# Patient Record
Sex: MALE | Race: WHITE | NOT HISPANIC OR LATINO | Employment: OTHER | URBAN - METROPOLITAN AREA
[De-identification: names, ages, dates, MRNs, and addresses within clinical notes are randomized per-mention and may not be internally consistent; named-entity substitution may affect disease eponyms.]

---

## 2017-06-02 RX ORDER — LEVOTHYROXINE SODIUM 175 UG/1
175 TABLET ORAL
Qty: 90 TAB | Refills: 0 | Status: SHIPPED | OUTPATIENT
Start: 2017-06-02 | End: 2017-09-01 | Stop reason: SDUPTHER

## 2017-07-14 ENCOUNTER — OFFICE VISIT (OUTPATIENT)
Dept: ENDOCRINOLOGY | Facility: MEDICAL CENTER | Age: 67
End: 2017-07-14
Payer: MEDICARE

## 2017-07-14 VITALS
DIASTOLIC BLOOD PRESSURE: 80 MMHG | HEART RATE: 56 BPM | OXYGEN SATURATION: 93 % | BODY MASS INDEX: 33.05 KG/M2 | HEIGHT: 72 IN | WEIGHT: 244 LBS | SYSTOLIC BLOOD PRESSURE: 136 MMHG

## 2017-07-14 DIAGNOSIS — E55.9 VITAMIN D DEFICIENCY: ICD-10-CM

## 2017-07-14 DIAGNOSIS — E03.9 HYPOTHYROIDISM, UNSPECIFIED TYPE: ICD-10-CM

## 2017-07-14 DIAGNOSIS — E53.8 VITAMIN B12 DEFICIENCY: ICD-10-CM

## 2017-07-14 PROCEDURE — 99214 OFFICE O/P EST MOD 30 MIN: CPT | Performed by: INTERNAL MEDICINE

## 2017-07-14 NOTE — MR AVS SNAPSHOT
Tj Daniels   2017 11:30 AM   Office Visit   MRN: [id number]    Department:  [facility]   Dept Phone:  [phone]    Description:  Male : 1950   Provider:  Larry Ly M.D.           Reason for Visit     Hypothyroidism           Allergies as of 2017     No Known Allergies      You were diagnosed with     Hypothyroidism, unspecified type   [2923491]       Vitamin D deficiency   [9270471]       Vitamin B12 deficiency   [943206]         Vital Signs     Blood Pressure Pulse Height Weight Body Mass Index Oxygen Saturation    136/80 mmHg 56 1.829 m (6') 110.678 kg (244 lb) 33.09 kg/m2 93%    Smoking Status                   Never Smoker            Basic Information     Date Of Birth Sex Race Ethnicity Preferred Language    1950 Male White Non- English      Problem List              ICD-10-CM Priority Class Noted - Resolved    FHx: prostate cancer Z80.42   2015 - Present    Hyperlipidemia E78.5   2015 - Present    Essential hypertension I10   2015 - Present    Other specified hypothyroidism E03.8   2015 - Present    Hashimoto's thyroiditis E06.3   2016 - Present    Thyroid nodule E04.1   2016 - Present      Health Maintenance        Date Due Completion Dates    COLONOSCOPY 1999 ---    IMM ZOSTER VACCINE 2009 ---    IMM INFLUENZA (1) 2017 11/10/2016    IMM PNEUMOCOCCAL 65+ (ADULT) LOW/MEDIUM RISK SERIES (2 of 2 - PCV13) 11/10/2017 11/10/2016    IMM DTaP/Tdap/Td Vaccine (2 - Td) 2025            Current Immunizations     Dtap Vaccine 2015    Influenza Vaccine Adult HD 11/10/2016 11:49 AM    Pneumococcal polysaccharide vaccine (PPSV-23) 11/10/2016 11:50 AM      Below and/or attached are the medications your provider expects you to take. Review all of your home medications and newly ordered medications with your provider and/or pharmacist. Follow medication instructions as directed by your provider and/or  pharmacist. Please keep your medication list with you and share with your provider. Update the information when medications are discontinued, doses are changed, or new medications (including over-the-counter products) are added; and carry medication information at all times in the event of emergency situations     Allergies:  No Known Allergies          Medications  Valid as of: July 14, 2017 -  1:12 PM    Generic Name Brand Name Tablet Size Instructions for use    Aspirin (Chew Tab) ASA 81 MG Take 81 mg by mouth every day.        Cholecalciferol (Tab) cholecalciferol 1000 UNIT Take 1,000 Units by mouth every day.        DilTIAZem HCl (Tab) CARDIZEM 120 MG 1 tab b.i.d.        Levothyroxine Sodium (Tab) SYNTHROID 175 MCG Take 1 Tab by mouth Every morning on an empty stomach.        Pravastatin Sodium (Tab) PRAVACHOL 10 MG Take 1 Tab by mouth every day.        .                 Medicines prescribed today were sent to:     [facility] [address] [address]    Phone: [phone] Fax: [phone]    Open 24 Hours?: No      Medication refill instructions:       If your prescription bottle indicates you have medication refills left, it is not necessary to call your provider’s office. Please contact your pharmacy and they will refill your medication.    If your prescription bottle indicates you do not have any refills left, you may request refills at any time through one of the following ways: The online [employer] system (except Urgent Care), by calling your provider’s office, or by asking your pharmacy to contact your provider’s office with a refill request. Medication refills are processed only during regular business hours and may not be available until the next business day. Your provider may request additional information or to have a follow-up visit with you prior to refilling your medication.   *Please Note: Medication refills are assigned a new Rx  number when refilled electronically. Your pharmacy may indicate that no refills were authorized even though a new prescription for the same medication is available at the pharmacy. Please request the medicine by name with the pharmacy before contacting your provider for a refill.        Your To Do List     Future Labs/Procedures Complete By Expires    FREE THYROXINE  As directed 7/14/2018    TRIIDOTHYRONINE  As directed 7/14/2018    Comments:    Free T3    TRIIDOTHYRONINE  As directed 7/14/2018    Comments:    Total T3    TSH  As directed 7/14/2018    VITAMIN B12  As directed 7/14/2018    VITAMIN D,25 HYDROXY  As directed 7/14/2018         [employer] Access Code: Activation code not generated  Current [employer] Status: Active

## 2017-07-14 NOTE — PROGRESS NOTES
Endocrinology Clinic Progress Note  PCP: Ubaldo Funes M.D.    HPI:  Tj Daniels is a 67 y.o. old patient who comes in today for routine follow up of hypothyroidism on 175 µg of levothyroxine daily. He denies any complains and feels well.    ROS:  Constitutional: No unintentional weight loss  Endo: Denies excessive thirst or frequent urination    Past Medical History:  Patient Active Problem List    Diagnosis Date Noted   • Hashimoto's thyroiditis 08/09/2016   • Thyroid nodule 08/09/2016   • Hyperlipidemia 08/11/2015   • Essential hypertension 08/11/2015   • Other specified hypothyroidism 08/11/2015   • FHx: prostate cancer 08/07/2015       Medications:    Current outpatient prescriptions:   •  levothyroxine (SYNTHROID) 175 MCG Tab, Take 1 Tab by mouth Every morning on an empty stomach., Disp: 90 Tab, Rfl: 0  •  aspirin (ASA) 81 MG Chew Tab chewable tablet, Take 81 mg by mouth every day., Disp: , Rfl:   •  vitamin D (CHOLECALCIFEROL) 1000 UNIT Tab, Take 1,000 Units by mouth every day., Disp: , Rfl:   •  pravastatin (PRAVACHOL) 10 MG Tab, Take 1 Tab by mouth every day., Disp: 30 Tab, Rfl: 3  •  diltiazem (CARDIZEM) 120 MG Tab, 1 tab b.i.d., Disp: 180 Tab, Rfl: 0    Labs:     Physical Examination:  Vital signs: /80 mmHg  Pulse 56  Ht 1.829 m (6')  Wt 110.678 kg (244 lb)  BMI 33.09 kg/m2  SpO2 93% Body mass index is 33.09 kg/(m^2).  General: No apparent distress, cooperative  Eyes: No scleral icterus, no discharge, normal eyelids  Neck: No abnormal masses on inspection, normal thyroid exam  Resp: Normal effort, clear to auscultation bilaterally  CVS: Regular rate and rhythm, S1 S2 normal, no murmur  Extremities: No lower extremity edema  Abdomen: abdominal obesity present  Musculoskeletal: Normal digits and nails  Skin: No rash on visible skin  Psych: Alert and oriented, normal mood and affect, intact memory and able to make informed decisions.    Assessment and Plan:    1. Hypothyroidism, unspecified  type  We'll order a thyroid panel and adjust his levothyroxine dose if needed.    2. Vitamin D deficiency  He's currently on 1000 units of vitamin D daily. Check the levels to make sure they are in good range.    3. Vitamin B12 deficiency  Rule out vitamin B12 deficiency.      Return in about 6 months (around 1/14/2018).    Thank you for allowing me to participate in the care of this patient.    Larry Ly M.D.    CC:   Ubaldo Funes M.D.    This note was created using voice recognition software (Dragon). The accuracy of the dictation is limited by the abilities of the software. I have reviewed the note prior to signing, however some errors in grammar and context are still possible. If you have any questions related to this note please do not hesitate to contact our office.

## 2017-07-24 DIAGNOSIS — E55.9 VITAMIN D DEFICIENCY: ICD-10-CM

## 2017-07-24 RX ORDER — ERGOCALCIFEROL 1.25 MG/1
50000 CAPSULE ORAL
Qty: 12 CAP | Refills: 0 | Status: SHIPPED | OUTPATIENT
Start: 2017-07-24 | End: 2017-10-10

## 2017-07-24 NOTE — PROGRESS NOTES
Called cell phone. Left message to call us back .   If he calls back let him know that his vitamin D is low. I have sent a loading dose to pharmacy. He should still continue  1000 units daily on top of this.

## 2017-09-01 RX ORDER — LEVOTHYROXINE SODIUM 175 UG/1
175 TABLET ORAL
Qty: 90 TAB | Refills: 0 | Status: SHIPPED | OUTPATIENT
Start: 2017-09-01 | End: 2017-09-01 | Stop reason: SDUPTHER

## 2017-09-01 RX ORDER — LEVOTHYROXINE SODIUM 175 UG/1
TABLET ORAL
Qty: 40 TAB | Refills: 3 | Status: SHIPPED | OUTPATIENT
Start: 2017-09-01 | End: 2017-11-13 | Stop reason: SDUPTHER

## 2017-09-01 NOTE — TELEPHONE ENCOUNTER
Tj Daniels would like a refill of the following medications:   levothyroxine (SYNTHROID) 175 MCG Tab [Miriam Fong M.D.]     Preferred pharmacy: [id number] [address]     Comment:   Dr Ly called several weeks ago to tell me he was increasing my Levo dosage. I told him at that time that I still had most of a 90 day supply remaining. He then told me to continue with the 175 Levo daily but 2 days a week take two 175's. I have now exhausted my Levo supply and need the Doctor to send new perscription with his new recommended dosage to [facility].   Thank You

## 2017-11-07 PROBLEM — E55.9 VITAMIN D DEFICIENCY: Status: ACTIVE | Noted: 2017-11-07

## 2017-11-13 RX ORDER — LEVOTHYROXINE SODIUM 175 UG/1
TABLET ORAL
Qty: 120 TAB | Refills: 3 | Status: SHIPPED | OUTPATIENT
Start: 2017-11-13 | End: 2017-11-13 | Stop reason: SDUPTHER

## 2017-11-14 RX ORDER — LEVOTHYROXINE SODIUM 175 UG/1
TABLET ORAL
Qty: 90 TAB | Refills: 3 | Status: SHIPPED | OUTPATIENT
Start: 2017-11-14 | End: 2018-11-16

## 2018-12-11 PROBLEM — R13.10 ODYNOPHAGIA: Status: ACTIVE | Noted: 2018-12-11

## 2018-12-12 ENCOUNTER — OFFICE VISIT (OUTPATIENT)
Dept: ENDOCRINOLOGY | Facility: MEDICAL CENTER | Age: 68
End: 2018-12-12
Payer: MEDICARE

## 2018-12-12 VITALS
BODY MASS INDEX: 34.81 KG/M2 | SYSTOLIC BLOOD PRESSURE: 118 MMHG | OXYGEN SATURATION: 94 % | HEIGHT: 72 IN | WEIGHT: 257 LBS | DIASTOLIC BLOOD PRESSURE: 74 MMHG | HEART RATE: 66 BPM

## 2018-12-12 DIAGNOSIS — E03.9 HYPOTHYROIDISM, UNSPECIFIED TYPE: ICD-10-CM

## 2018-12-12 DIAGNOSIS — E55.9 VITAMIN D DEFICIENCY: ICD-10-CM

## 2018-12-12 PROCEDURE — 99214 OFFICE O/P EST MOD 30 MIN: CPT | Performed by: INTERNAL MEDICINE

## 2018-12-12 RX ORDER — DILTIAZEM HYDROCHLORIDE 120 MG/1
CAPSULE, EXTENDED RELEASE ORAL
COMMUNITY
Start: 2018-11-30 | End: 2018-12-12

## 2018-12-12 RX ORDER — LEVOTHYROXINE SODIUM 0.2 MG/1
TABLET ORAL
Qty: 90 TAB | Refills: 3 | Status: SHIPPED | OUTPATIENT
Start: 2018-12-12 | End: 2019-07-23 | Stop reason: SDUPTHER

## 2018-12-12 RX ORDER — LEVOTHYROXINE SODIUM 0.03 MG/1
TABLET ORAL
Qty: 90 TAB | Refills: 3 | Status: SHIPPED | OUTPATIENT
Start: 2018-12-12 | End: 2019-07-23

## 2018-12-12 RX ORDER — ERGOCALCIFEROL 1.25 MG/1
50000 CAPSULE ORAL
Qty: 12 CAP | Refills: 3 | Status: SHIPPED | OUTPATIENT
Start: 2018-12-12 | End: 2019-10-29 | Stop reason: SDUPTHER

## 2018-12-12 RX ORDER — LEVOTHYROXINE SODIUM 175 UG/1
TABLET ORAL
COMMUNITY
Start: 2018-10-18 | End: 2018-12-12

## 2018-12-12 NOTE — PROGRESS NOTES
Endocrinology Clinic Progress Note  PCP: Ubaldo Funes M.D.    HPI:  Tj Daniels is a 68 y.o. old patient who comes in today for review of endocrine problems.   Hypothyroid, currently on levothyroxine 175 mcg 2 tabs two days a week   Vitamin d deficiency, currently on vitamin d 2000 iu per day    ROS:  Constitutional: No weight loss  Cardiac: No palpitations or racing heart  Resp: No shortness of breath  Neuro: No numbness or tinging in feet  Endo: No heat or cold intolerance, no polyuria or polydipsia  All other systems were reviewed and were negative.    Past Medical History:  Patient Active Problem List    Diagnosis Date Noted   • Odynophagia 12/11/2018   • Vitamin D deficiency 11/07/2017   • Hashimoto's thyroiditis 08/09/2016   • Thyroid nodule 08/09/2016   • Hyperlipidemia 08/11/2015   • Essential hypertension 08/11/2015   • Other specified hypothyroidism 08/11/2015   • FHx: prostate cancer 08/07/2015       Past Surgical History:  History reviewed. No pertinent surgical history.    Allergies:  Patient has no known allergies.    Social History:  Social History     Social History   • Marital status:      Spouse name: N/A   • Number of children: N/A   • Years of education: N/A     Occupational History   • Not on file.     Social History Main Topics   • Smoking status: Never Smoker   • Smokeless tobacco: Former User   • Alcohol use 1.2 oz/week     2 Glasses of wine per week   • Drug use: No   • Sexual activity: Not on file     Other Topics Concern   • Not on file     Social History Narrative   • No narrative on file       Family History:  Family History   Problem Relation Age of Onset   • Cancer Father    • Cancer Sister    • Other Sister         thyroid   • Cancer Unknown         Prostate   • Cancer Brother        Medications:    Current Outpatient Prescriptions:   •  vitamin D, Ergocalciferol, (DRISDOL) 24115 units Cap capsule, Take 1 Cap by mouth every 7 days., Disp: 12 Cap, Rfl: 3  •  levothyroxine  (SYNTHROID) 200 MCG Tab, 1 tablet daily along with 25 mcg of levothyroxine daily. (Total daily dose=225 mcg), Disp: 90 Tab, Rfl: 3  •  levothyroxine (SYNTHROID) 25 MCG Tab, 1 tablet daily along with 200 mcg of levothyroxine daily. (Total daily dose=225 mcg), Disp: 90 Tab, Rfl: 3  •  losartan (COZAAR) 25 MG Tab, 1 bid (Patient taking differently: 50 mg.), Disp: 180 Tab, Rfl: 3  •  DILTIAZem CD (DILTIAZEM CD) 120 MG CAPSULE SR 24 HR, Take 1 Cap by mouth every day., Disp: 90 Cap, Rfl: 3  •  pravastatin (PRAVACHOL) 10 MG Tab, Take 1 Tab by mouth every day., Disp: 90 Tab, Rfl: 3  •  aspirin (ASA) 81 MG Chew Tab chewable tablet, Take 81 mg by mouth every day., Disp: , Rfl:   •  vitamin D (CHOLECALCIFEROL) 1000 UNIT Tab, Take 2,000 Units by mouth every day., Disp: , Rfl:   •  indomethacin (INDOCIN) 25 MG Cap, To start on week 2. 1 tab po tid with decreasing dose as tolerated, Disp: 21 Cap, Rfl: 0    Labs: Reviewed    Physical Examination:  Vital signs: /74   Pulse 66   Ht 1.829 m (6')   Wt 116.6 kg (257 lb)   SpO2 94%   BMI 34.86 kg/m²  Body mass index is 34.86 kg/m².  General: No apparent distress, cooperative  Eyes: No scleral icterus or discharge  ENMT: Normal on external inspection of nose, lips, normal thyroid exam  Neck: No abnormal masses on inspection  Resp: Normal effort, clear to auscultation bilaterally   CVS: Regular rate and rhythm, S1 S2 normal, no murmur   Extremities: No edema  Abdomen: abdominal obesity present  Neuro: Alert and oriented  Skin: No rash  Psych: Normal mood and affect, intact memory and able to make informed decisions    Assessment and Plan:    1. Hypothyroidism, unspecified type  With the way he is currently taking his levothyroxine is average daily dose is around 225 mcg daily.  I will change his prescription to 225 mcg daily.    2. Vitamin D deficiency  Recommend 50,000 units once a week.      Return in about 3 months (around 3/12/2019).    Total face to face time spent with  patient equals 60 minutes.Thank you for allowing me to participate in the care of this patient.    Larry Ly M.D.  12/12/18    CC:   Ubaldo Funes M.D.    This note was created using voice recognition software (Dragon). The accuracy of the dictation is limited by the abilities of the software. I have reviewed the note prior to signing, however some errors in grammar and context are still possible. If you have any questions related to this note please do not hesitate to contact our office.

## 2019-01-17 PROBLEM — R13.10 ODYNOPHAGIA: Status: RESOLVED | Noted: 2018-12-11 | Resolved: 2019-01-17

## 2019-01-17 PROBLEM — K21.00 GASTROESOPHAGEAL REFLUX DISEASE WITH ESOPHAGITIS: Status: ACTIVE | Noted: 2019-01-17

## 2019-01-17 PROBLEM — K22.2 ESOPHAGEAL STRICTURE: Status: ACTIVE | Noted: 2019-01-17

## 2019-03-27 ENCOUNTER — OFFICE VISIT (OUTPATIENT)
Dept: ENDOCRINOLOGY | Facility: MEDICAL CENTER | Age: 69
End: 2019-03-27
Payer: MEDICARE

## 2019-03-27 VITALS
HEART RATE: 57 BPM | BODY MASS INDEX: 34.54 KG/M2 | DIASTOLIC BLOOD PRESSURE: 74 MMHG | WEIGHT: 255 LBS | HEIGHT: 72 IN | OXYGEN SATURATION: 94 % | SYSTOLIC BLOOD PRESSURE: 122 MMHG

## 2019-03-27 DIAGNOSIS — E03.9 HYPOTHYROIDISM, UNSPECIFIED TYPE: ICD-10-CM

## 2019-03-27 DIAGNOSIS — E55.9 VITAMIN D DEFICIENCY: ICD-10-CM

## 2019-03-27 DIAGNOSIS — E53.8 VITAMIN B12 DEFICIENCY: ICD-10-CM

## 2019-03-27 PROCEDURE — 99214 OFFICE O/P EST MOD 30 MIN: CPT | Performed by: INTERNAL MEDICINE

## 2019-03-28 NOTE — PROGRESS NOTES
Endocrinology Clinic Progress Note  PCP: Ubaldo Funes M.D.    HPI:  Tj Daniels is a 68 y.o. old patient who comes in today for review of endocrine problems.  Hypothyroidism: Currently on 225 mcg of levothyroxine daily.  Vitamin D deficiency: On 50,000 units once a week.    He overall feels well and denies any complaints.    ROS:  Constitutional: No unintentional weight loss  Endo: Denies excessive thirst or frequent urination  All other systems were reviewed and were negative.    Past Medical History:  Patient Active Problem List    Diagnosis Date Noted   • Esophageal stricture 01/17/2019   • Gastroesophageal reflux disease with esophagitis 01/17/2019   • Vitamin D deficiency 11/07/2017   • Hashimoto's thyroiditis 08/09/2016   • Thyroid nodule 08/09/2016   • Hyperlipidemia 08/11/2015   • Essential hypertension 08/11/2015   • Other specified hypothyroidism 08/11/2015   • FHx: prostate cancer 08/07/2015       Medications:    Current Outpatient Prescriptions:   •  pantoprazole (PROTONIX) 20 MG tablet, Take 20 mg by mouth every day., Disp: , Rfl:   •  vitamin D, Ergocalciferol, (DRISDOL) 38842 units Cap capsule, Take 1 Cap by mouth every 7 days., Disp: 12 Cap, Rfl: 3  •  losartan (COZAAR) 25 MG Tab, 1 bid (Patient taking differently: 50 mg.), Disp: 180 Tab, Rfl: 3  •  DILTIAZem CD (DILTIAZEM CD) 120 MG CAPSULE SR 24 HR, Take 1 Cap by mouth every day., Disp: 90 Cap, Rfl: 3  •  pravastatin (PRAVACHOL) 10 MG Tab, Take 1 Tab by mouth every day., Disp: 90 Tab, Rfl: 3  •  aspirin (ASA) 81 MG Chew Tab chewable tablet, Take 81 mg by mouth every day., Disp: , Rfl:   •  vitamin D (CHOLECALCIFEROL) 1000 UNIT Tab, Take 2,000 Units by mouth every day., Disp: , Rfl:   •  levothyroxine (SYNTHROID) 200 MCG Tab, 1 tablet daily along with 25 mcg of levothyroxine daily. (Total daily dose=225 mcg) (Patient not taking: Reported on 3/27/2019), Disp: 90 Tab, Rfl: 3  •  levothyroxine (SYNTHROID) 25 MCG Tab, 1 tablet daily along with  200 mcg of levothyroxine daily. (Total daily dose=225 mcg) (Patient not taking: Reported on 3/27/2019), Disp: 90 Tab, Rfl: 3  •  indomethacin (INDOCIN) 25 MG Cap, To start on week 2. 1 tab po tid with decreasing dose as tolerated, Disp: 21 Cap, Rfl: 0    Labs: Reviewed    Physical Examination:  Vital signs: /74 (BP Location: Left arm, Patient Position: Sitting, BP Cuff Size: Large adult)   Pulse (!) 57   Ht 1.829 m (6')   Wt 115.7 kg (255 lb)   SpO2 94%   BMI 34.58 kg/m²  Body mass index is 34.58 kg/m².  General: No apparent distress, cooperative  Eyes: No scleral icterus, no discharge, normal eyelids  Neck: No abnormal masses on inspection, normal thyroid exam  Resp: Normal effort, clear to auscultation bilaterally  CVS: Regular rate and rhythm, S1 S2 normal, no murmur  Extremities: No lower extremity edema  Abdomen: abdominal obesity present  Musculoskeletal: Normal digits and nails  Skin: No rash on visible skin  Psych: Alert and oriented, normal mood and affect, intact memory and able to make informed decisions.    Assessment and Plan:    1. Hypothyroidism, unspecified type  Continue current dose of levothyroxine  - TSH; Future  - FREE THYROXINE; Future  - T3 FREE; Future  - TRIIDOTHYRONINE; Future    2. Vitamin D deficiency  continue vitamin D supplementation.  - VITAMIN D,25 HYDROXY; Future    3. Vitamin B12 deficiency  Continue vitamin B12 supplementation.  - VITAMIN B12; Future    Return in about 4 months (around 7/27/2019).    Thank you for allowing me to participate in the care of this patient.    Larry Ly    CC:   Ubaldo Funes M.D.    This note was created using voice recognition software (Dragon). The accuracy of the dictation is limited by the abilities of the software. I have reviewed the note prior to signing, however some errors in grammar and context are still possible. If you have any questions related to this note please do not hesitate to contact our office.

## 2019-04-01 ENCOUNTER — TELEPHONE (OUTPATIENT)
Dept: ENDOCRINOLOGY | Facility: MEDICAL CENTER | Age: 69
End: 2019-04-01

## 2019-04-01 NOTE — TELEPHONE ENCOUNTER
Phone Number Called: [phone] (home)       Message: lm for patient to call back to schedule apt     Left Message for patient to call back: no

## 2019-04-01 NOTE — TELEPHONE ENCOUNTER
----- Message from Tj Daniels sent at 3/27/2019  5:24 PM PDT -----  Regarding: Non-Urgent Medical Question  Contact: [phone]  Need appointment scheduled July 22 or 23 or 25 or 26 ( please not the 24th). Afternoons preferred. Driving from [place].

## 2019-07-22 NOTE — PROGRESS NOTES
Endocrinology Clinic Progress Note  PCP: Ubaldo Funes M.D.    HPI:  Tj Daniels is a 69 y.o. old patient who comes in today for review of endocrine problems.    Hypothyroidism: Currently taking Levothyroxine 225 mcg daily.  Denies problems with anxiety, heart palpitations or sweating.  He does have a hard time staying asleep at night.    Vitamin D Deficiency:   Currently taking Vitamin D 50,000 units weekly plus Vitamin 2000 units daily.      ROS:  Constitutional:  15 pound weight loss on keto diet.  Endo: Denies excessive thirst or frequent urination  All other systems were reviewed and were negative.    Past Medical History:  Patient Active Problem List    Diagnosis Date Noted   • Esophageal stricture 01/17/2019   • Gastroesophageal reflux disease with esophagitis 01/17/2019   • Vitamin D deficiency 11/07/2017   • Hashimoto's thyroiditis 08/09/2016   • Thyroid nodule 08/09/2016   • Hyperlipidemia 08/11/2015   • Essential hypertension 08/11/2015   • Other specified hypothyroidism 08/11/2015   • FHx: prostate cancer 08/07/2015       Medications:    Current Outpatient Prescriptions:   •  levothyroxine (SYNTHROID) 200 MCG Tab, Take 1 Tab by mouth Every morning on an empty stomach., Disp: 90 Tab, Rfl: 3  •  pantoprazole (PROTONIX) 20 MG tablet, Take 20 mg by mouth every day., Disp: , Rfl:   •  vitamin D, Ergocalciferol, (DRISDOL) 67560 units Cap capsule, Take 1 Cap by mouth every 7 days., Disp: 12 Cap, Rfl: 3  •  losartan (COZAAR) 25 MG Tab, 1 bid (Patient taking differently: 50 mg.), Disp: 180 Tab, Rfl: 3  •  DILTIAZem CD (DILTIAZEM CD) 120 MG CAPSULE SR 24 HR, Take 1 Cap by mouth every day., Disp: 90 Cap, Rfl: 3  •  pravastatin (PRAVACHOL) 10 MG Tab, Take 1 Tab by mouth every day., Disp: 90 Tab, Rfl: 3  •  aspirin (ASA) 81 MG Chew Tab chewable tablet, Take 81 mg by mouth every day., Disp: , Rfl:   •  vitamin D (CHOLECALCIFEROL) 1000 UNIT Tab, Take 2,000 Units by mouth every day., Disp: , Rfl:   •  indomethacin  (INDOCIN) 25 MG Cap, To start on week 2. 1 tab po tid with decreasing dose as tolerated, Disp: 21 Cap, Rfl: 0    Labs: Reviewed    Physical Examination:  Vital signs: /80   Pulse 60   Ht 1.829 m (6')   Wt 109.3 kg (241 lb)   SpO2 94%   BMI 32.69 kg/m²  Body mass index is 32.69 kg/m². Patient's body mass index is 32.69 kg/m². Exercise and nutrition counseling were performed at this visit.  Started Yoga and gofing.  General: No apparent distress, cooperative  Eyes: No scleral icterus, no discharge, normal eyelids  Neck: No abnormal masses on inspection, normal thyroid exam  Resp: Normal effort, clear to auscultation bilaterally  CVS: Regular rate and rhythm, S1 S2 normal, no murmur  Extremities: No lower extremity edema  Abdomen: abdominal obesity present  Musculoskeletal: Normal digits and nails  Skin: No rash on visible skin  Psych: Alert and oriented, normal mood and affect, intact memory and able to make informed decisions.    Assessment and Plan:    1. Acquired hypothyroidism  Reduce levothyroxine to 200 mcg daily    2. Vitamin D deficiency  continue vitamin D supplementation    Return in about 3 months (around 10/23/2019).    Thank you for allowing me to participate in the care of this patient.    Larry Ly M.D.  This note was scribed by Penelope Green RN CDE  CC:   Ubaldo Funes M.D.    This note was created using voice recognition software (Dragon). The accuracy of the dictation is limited by the abilities of the software. I have reviewed the note prior to signing, however some errors in grammar and context are still possible. If you have any questions related to this note please do not hesitate to contact our office.

## 2019-07-23 ENCOUNTER — OFFICE VISIT (OUTPATIENT)
Dept: ENDOCRINOLOGY | Facility: MEDICAL CENTER | Age: 69
End: 2019-07-23
Payer: MEDICARE

## 2019-07-23 VITALS
WEIGHT: 241 LBS | OXYGEN SATURATION: 94 % | DIASTOLIC BLOOD PRESSURE: 80 MMHG | HEART RATE: 60 BPM | BODY MASS INDEX: 32.64 KG/M2 | SYSTOLIC BLOOD PRESSURE: 122 MMHG | HEIGHT: 72 IN

## 2019-07-23 DIAGNOSIS — E55.9 VITAMIN D DEFICIENCY: ICD-10-CM

## 2019-07-23 DIAGNOSIS — E03.9 ACQUIRED HYPOTHYROIDISM: ICD-10-CM

## 2019-07-23 PROCEDURE — 99214 OFFICE O/P EST MOD 30 MIN: CPT | Performed by: INTERNAL MEDICINE

## 2019-07-23 RX ORDER — LEVOTHYROXINE SODIUM 0.2 MG/1
200 TABLET ORAL
Qty: 90 TAB | Refills: 3 | Status: SHIPPED | OUTPATIENT
Start: 2019-07-23 | End: 2020-09-22 | Stop reason: SDUPTHER

## 2019-10-27 NOTE — PROGRESS NOTES
Endocrinology Clinic Progress Note  PCP: Ubaldo Funes M.D.    HPI:  Tj Pal is a 69 y.o. old patient who comes in today for review of endocrine problems.  States he is feeling good with no complaints.      Hypothyroidism: Currently taking Levothyroxine 200 mcg daily.  Results for TJ PAL (MRN [id number]) as of 10/27/2019 14:28   Ref. Range 10/21/2019 14:33   TSH Latest Ref Range: 0.36 - 3.74 uIU/mL 0.15 (L)   Free T-4 Latest Ref Range: 0.76 - 1.46 ng/dL 1.42   T3 Latest Ref Range: 60 - 181 ng/dL 80   T3,Free Latest Ref Range: 2.18 - 3.98 pg/mL 2.46        Vitamin D Deficiency:   Currently taking Vitamin D 50,000 units weekly.  Results for TJ PAL (MRN [id number]) as of 10/27/2019 14:28   Ref. Range 10/21/2019 14:30   25-Hydroxy   Vitamin D 25 Latest Ref Range: 30 - 100 ng/mL 51        ROS:  Constitutional: No unintentional weight loss  Endo: Denies excessive thirst or frequent urination  All other systems were reviewed and were negative.    Past Medical History:  Patient Active Problem List    Diagnosis Date Noted   • Esophageal stricture 01/17/2019   • Gastroesophageal reflux disease with esophagitis 01/17/2019   • Vitamin D deficiency 11/07/2017   • Hashimoto's thyroiditis 08/09/2016   • Thyroid nodule 08/09/2016   • Hyperlipidemia 08/11/2015   • Essential hypertension 08/11/2015   • Other specified hypothyroidism 08/11/2015   • FHx: prostate cancer 08/07/2015       Medications:    Current Outpatient Medications:   •  vitamin D, Ergocalciferol, (DRISDOL) 06209 units Cap capsule, Take 1 Cap by mouth every 7 days., Disp: 12 Cap, Rfl: 3  •  pravastatin (PRAVACHOL) 10 MG Tab, Take 1 Tab by mouth every day., Disp: 100 Tab, Rfl: 3  •  losartan (COZAAR) 25 MG Tab, 1 bid, Disp: 200 Tab, Rfl: 3  •  levothyroxine (SYNTHROID) 200 MCG Tab, Take 1 Tab by mouth Every morning on an empty stomach., Disp: 90 Tab, Rfl: 3  •  pantoprazole (PROTONIX) 20 MG tablet, Take 20 mg by mouth every day., Disp: , Rfl:  [facility] OPERATING ROOM  Brief Op Note     Casie Lawson Location: OR   [facility] [id number] MRN [id number]   Admission Date 4/4/2017 Operation Date 4/4/2017   Attending Physician Tara Burnett MD Operating Physician Arnel Parrish MD       Pre-Opera   •  DILTIAZem CD (DILTIAZEM CD) 120 MG CAPSULE SR 24 HR, Take 1 Cap by mouth every day., Disp: 90 Cap, Rfl: 3  •  indomethacin (INDOCIN) 25 MG Cap, To start on week 2. 1 tab po tid with decreasing dose as tolerated, Disp: 21 Cap, Rfl: 0  •  aspirin (ASA) 81 MG Chew Tab chewable tablet, Take 81 mg by mouth every day., Disp: , Rfl:     Labs: Reviewed    Physical Examination:  Vital signs: /60   Pulse 68   Ht 1.829 m (6')   Wt 107 kg (236 lb)   SpO2 96%   BMI 32.01 kg/m²  Body mass index is 32.01 kg/m². Patient's body mass index is 32.01 kg/m². Exercise and nutrition counseling were performed at this visit.  Active throughout the week.  General: No apparent distress, cooperative  Eyes: No scleral icterus, no discharge, normal eyelids  Neck: No abnormal masses on inspection, normal thyroid exam  Resp: Normal effort, clear to auscultation bilaterally  CVS: Regular rate and rhythm, S1 S2 normal, no murmur  Extremities: No lower extremity edema  Abdomen:  Mild abdominal obesity present  Musculoskeletal: Normal digits and nails  Skin: No rash on visible skin  Psych: Alert and oriented, normal mood and affect, intact memory and able to make informed decisions.    Assessment and Plan:    1. Acquired hypothyroidism  To new current dose of levothyroxine.  Clinically euthyroid and feels exceedingly well    2. Vitamin D deficiency  Continue vitamin D supplementation.    Return in about 6 months (around 4/29/2020).    Thank you for allowing me to participate in the care of this patient.    Larry Ly M.D.  This note was scribed by Penelope Green RN CDE  CC:   Ubaldo Funes M.D.    This note was created using voice recognition software (Dragon). The accuracy of the dictation is limited by the abilities of the software. I have reviewed the note prior to signing, however some errors in grammar and context are still possible. If you have any questions related to this note please do not hesitate to contact our office.

## 2019-10-29 ENCOUNTER — OFFICE VISIT (OUTPATIENT)
Dept: ENDOCRINOLOGY | Facility: MEDICAL CENTER | Age: 69
End: 2019-10-29
Payer: MEDICARE

## 2019-10-29 VITALS
HEART RATE: 68 BPM | OXYGEN SATURATION: 96 % | WEIGHT: 236 LBS | SYSTOLIC BLOOD PRESSURE: 120 MMHG | DIASTOLIC BLOOD PRESSURE: 60 MMHG | HEIGHT: 72 IN | BODY MASS INDEX: 31.97 KG/M2

## 2019-10-29 DIAGNOSIS — E53.8 VITAMIN B12 DEFICIENCY: ICD-10-CM

## 2019-10-29 DIAGNOSIS — E55.9 VITAMIN D DEFICIENCY: ICD-10-CM

## 2019-10-29 DIAGNOSIS — E03.9 ACQUIRED HYPOTHYROIDISM: ICD-10-CM

## 2019-10-29 PROCEDURE — 99214 OFFICE O/P EST MOD 30 MIN: CPT | Performed by: INTERNAL MEDICINE

## 2019-10-29 RX ORDER — ERGOCALCIFEROL 1.25 MG/1
50000 CAPSULE ORAL
Qty: 12 CAP | Refills: 3 | Status: SHIPPED | OUTPATIENT
Start: 2019-10-29 | End: 2020-09-22 | Stop reason: SDUPTHER

## 2019-10-29 NOTE — LETTER
Larry Ly M.D.  [facility]   [address], [address] [place] [address]  Phone: [phone]  Fax: [phone]              October 29, 2019 -        Provider: Larry Ly M.D.  Location of Care: [facility]  [address]  [place] [address]      Patient:   Tj Daniels   MR Number: [id number]   YOB: 1950         To Whomsoever it may concern    He has chronic vit d deficiency.Please let me know if you have any questions or concerns.       Electronically sign by Larry Ly M.D. on 10/29/19

## 2020-03-24 PROBLEM — R01.1 SYSTOLIC MURMUR: Status: ACTIVE | Noted: 2020-03-24

## 2020-04-28 PROBLEM — E03.8 HYPOTHYROIDISM DUE TO HASHIMOTO'S THYROIDITIS: Status: ACTIVE | Noted: 2020-04-28

## 2020-04-28 PROBLEM — E06.3 HYPOTHYROIDISM DUE TO HASHIMOTO'S THYROIDITIS: Status: ACTIVE | Noted: 2020-04-28

## 2020-04-28 NOTE — PROGRESS NOTES
"Endocrinology Clinic Progress Note  PCP: Ubaldo Funes M.D.  This encounter was conducted via {Platform used:22222::\"Zoom \"}.   Verbal consent was obtained. Patient's identity was verified.  HPI:  Tj Daniels is a 69 y.o. old patient who is seen today for review of his hypothyroid and Vitamin D deficiency.   Hypothyroid due to Hashimoto's thyroiditis.  He is currently on Levothyroxine 200 mcg per day on empty stomach.   Patient reports compliance with medication.      Ref. Range 10/21/2019 14:33   TSH Latest Ref Range: 0.36 - 3.74 uIU/mL 0.15 (L)   Free T-4 Latest Ref Range: 0.76 - 1.46 ng/dL 1.42   T3 Latest Ref Range: 60 - 181 ng/dL 80   T3,Free Latest Ref Range: 2.18 - 3.98 pg/mL 2.46     Vitamin D deficiency: currently on Vitamin D 11362 iu per week.      Ref. Range 10/21/2019 14:30   25-Hydroxy   Vitamin D 25 Latest Ref Range: 30 - 100 ng/mL 51         ROS:  Constitutional: No weight loss  Cardiac: No palpitations or racing heart  Resp: No shortness of breath  Neuro: No numbness or tinging in feet  Endo: No heat or cold intolerance, no polyuria or polydipsia  All other systems were reviewed and were negative.    Past Medical History:  Patient Active Problem List    Diagnosis Date Noted   • Hypothyroidism due to Hashimoto's thyroiditis 04/28/2020   • Systolic murmur 03/24/2020   • Esophageal stricture 01/17/2019   • Gastroesophageal reflux disease with esophagitis 01/17/2019   • Vitamin D deficiency 11/07/2017   • Hashimoto's thyroiditis 08/09/2016   • Thyroid nodule 08/09/2016   • Hyperlipidemia 08/11/2015   • Essential hypertension 08/11/2015   • Other specified hypothyroidism 08/11/2015   • FHx: prostate cancer 08/07/2015       Past Surgical History:  No past surgical history on file.    Allergies:  Patient has no known allergies.    Social History:  Social History     Socioeconomic History   • Marital status:      Spouse name: Not on file   • Number of children: Not on file   • Years of " education: Not on file   • Highest education level: Not on file   Occupational History   • Not on file   Social Needs   • Financial resource strain: Not on file   • Food insecurity     Worry: Not on file     Inability: Not on file   • Transportation needs     Medical: Not on file     Non-medical: Not on file   Tobacco Use   • Smoking status: Never Smoker   • Smokeless tobacco: Former User   Substance and Sexual Activity   • Alcohol use: Yes     Alcohol/week: 1.2 oz     Types: 2 Glasses of wine per week     Frequency: 2-4 times a month     Drinks per session: 1 or 2     Binge frequency: Never   • Drug use: No   • Sexual activity: Not on file   Lifestyle   • Physical activity     Days per week: Not on file     Minutes per session: Not on file   • Stress: Not on file   Relationships   • Social connections     Talks on phone: Not on file     Gets together: Not on file     Attends [employer] service: Not on file     Active member of club or organization: Not on file     Attends meetings of clubs or organizations: Not on file     Relationship status: Not on file   • Intimate partner violence     Fear of current or ex partner: Not on file     Emotionally abused: Not on file     Physically abused: Not on file     Forced sexual activity: Not on file   Other Topics Concern   • Not on file   Social History Narrative   • Not on file       Family History:  Family History   Problem Relation Age of Onset   • Cancer Father    • Cancer Sister    • Other Sister         thyroid   • Cancer Other         Prostate   • Cancer Brother        Medications:    Current Outpatient Medications:   •  doxazosin (CARDURA) 1 MG Tab, Take 1 Tab by mouth every day., Disp: 180 Tab, Rfl: 3  •  fluticasone (FLOVENT HFA) 220 MCG/ACT Aerosol, Inhale 1 Puff by mouth 2 times a day. During periods of asthma exacerbation., Disp: 1 Inhaler, Rfl: 3  •  losartan (COZAAR) 25 MG Tab, 1 daily, Disp: 100 Tab, Rfl: 3  •  albuterol (PROAIR HFA) 108 (90 Base) MCG/ACT  Aero Soln inhalation aerosol, Inhale 2 Puffs by mouth every 6 hours as needed for Shortness of Breath., Disp: 8.5 g, Rfl: 3  •  DILTIAZem CD (DILTIAZEM CD) 120 MG CAPSULE SR 24 HR, Take 1 Cap by mouth every day., Disp: 100 Cap, Rfl: 3  •  vitamin D, Ergocalciferol, (DRISDOL) 41392 units Cap capsule, Take 1 Cap by mouth every 7 days., Disp: 12 Cap, Rfl: 3  •  pravastatin (PRAVACHOL) 10 MG Tab, Take 1 Tab by mouth every day., Disp: 100 Tab, Rfl: 3  •  levothyroxine (SYNTHROID) 200 MCG Tab, Take 1 Tab by mouth Every morning on an empty stomach., Disp: 90 Tab, Rfl: 3  •  pantoprazole (PROTONIX) 20 MG tablet, Take 20 mg by mouth every day., Disp: , Rfl:   •  indomethacin (INDOCIN) 25 MG Cap, To start on week 2. 1 tab po tid with decreasing dose as tolerated, Disp: 21 Cap, Rfl: 0  •  aspirin (ASA) 81 MG Chew Tab chewable tablet, Take 81 mg by mouth every day., Disp: , Rfl:     Labs: Reviewed    Physical Examination:  Vital signs: There were no vitals taken for this visit. There is no height or weight on file to calculate BMI.  General: No apparent distress, cooperative  Eyes: No scleral icterus or discharge  ENMT: Normal on external inspection of nose, lips, normal thyroid exam  Neck: No abnormal masses on inspection  Resp: Normal effort, clear to auscultation bilaterally   CVS: Regular rate and rhythm, S1 S2 normal, no murmur   Extremities: No edema  Abdomen: abdominal obesity present  Neuro: Alert and oriented  Skin: No rash  Psych: Normal mood and affect, intact memory and able to make informed decisions    Assessment and Plan:  1. Hypothyroidism due to Hashimoto's thyroiditis  ***    2. Vitamin D deficiency  ***      No follow-ups on file.    Thank you for allowing me to participate in the care of this patient.    Melonie Harding R.N.  04/28/20    CC:   Ubaldo Funes M.D.    This note was created using voice recognition software (Dragon). The accuracy of the dictation is limited by the abilities of the software. I  have reviewed the note prior to signing, however some errors in grammar and context are still possible. If you have any questions related to this note please do not hesitate to contact our office.

## 2020-04-29 ENCOUNTER — APPOINTMENT (OUTPATIENT)
Dept: ENDOCRINOLOGY | Facility: MEDICAL CENTER | Age: 70
End: 2020-04-29
Payer: MEDICARE

## 2020-05-05 PROBLEM — I35.1 MILD AORTIC INSUFFICIENCY: Status: ACTIVE | Noted: 2020-05-05

## 2020-05-05 PROBLEM — I34.0 MILD MITRAL REGURGITATION: Status: ACTIVE | Noted: 2020-05-05

## 2020-09-22 DIAGNOSIS — E03.9 ACQUIRED HYPOTHYROIDISM: ICD-10-CM

## 2020-09-22 DIAGNOSIS — E55.9 VITAMIN D DEFICIENCY: ICD-10-CM

## 2020-09-22 RX ORDER — LEVOTHYROXINE SODIUM 0.2 MG/1
200 TABLET ORAL
Qty: 90 TAB | Refills: 3 | Status: SHIPPED | OUTPATIENT
Start: 2020-09-22 | End: 2020-09-23

## 2020-09-22 RX ORDER — ERGOCALCIFEROL 1.25 MG/1
50000 CAPSULE ORAL
Qty: 12 CAP | Refills: 3 | Status: SHIPPED | OUTPATIENT
Start: 2020-09-22 | End: 2021-11-16

## 2020-09-23 DIAGNOSIS — E53.8 VITAMIN B12 DEFICIENCY: ICD-10-CM

## 2020-09-23 DIAGNOSIS — E03.9 ACQUIRED HYPOTHYROIDISM: ICD-10-CM

## 2020-09-23 DIAGNOSIS — E55.9 VITAMIN D DEFICIENCY: ICD-10-CM

## 2020-10-16 PROBLEM — R01.1 SYSTOLIC MURMUR: Status: RESOLVED | Noted: 2020-03-24 | Resolved: 2020-10-16

## 2020-10-16 PROBLEM — J45.20 MILD INTERMITTENT ASTHMA WITHOUT COMPLICATION: Status: ACTIVE | Noted: 2020-10-16

## 2022-11-16 PROBLEM — I35.8 AORTIC VALVE SCLEROSIS: Status: ACTIVE | Noted: 2022-11-16

## 2024-01-02 PROBLEM — R73.03 PREDIABETES: Status: ACTIVE | Noted: 2024-01-02

## 2024-01-12 PROBLEM — E66.9 OBESITY (BMI 30-39.9): Status: ACTIVE | Noted: 2024-01-12
